# Patient Record
Sex: FEMALE | URBAN - METROPOLITAN AREA
[De-identification: names, ages, dates, MRNs, and addresses within clinical notes are randomized per-mention and may not be internally consistent; named-entity substitution may affect disease eponyms.]

---

## 2021-06-04 ENCOUNTER — IMPORTED ENCOUNTER (OUTPATIENT)
Dept: URBAN - METROPOLITAN AREA CLINIC 1 | Facility: CLINIC | Age: 81
End: 2021-06-04

## 2021-06-04 PROBLEM — H35.3121: Noted: 2021-06-04

## 2021-06-04 PROBLEM — E11.3392: Noted: 2021-06-04

## 2021-06-04 PROBLEM — Z79.4: Noted: 2021-06-04

## 2021-06-04 PROBLEM — E11.3291: Noted: 2021-06-04

## 2021-06-04 PROBLEM — Z96.1: Noted: 2021-06-04

## 2021-06-04 PROBLEM — H25.11: Noted: 2021-06-04

## 2021-06-04 PROBLEM — E11.3293: Noted: 2021-06-04

## 2021-06-04 PROBLEM — H10.45: Noted: 2021-06-04

## 2021-06-04 PROCEDURE — 99204 OFFICE O/P NEW MOD 45 MIN: CPT

## 2021-06-04 PROCEDURE — 92015 DETERMINE REFRACTIVE STATE: CPT

## 2021-06-04 NOTE — PATIENT DISCUSSION
1.  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 Will refer to Retina for further  evaluation/treatment. Discussed the pathophysiology of diabetes and its effect on the eye and risk of blindness. Stressed the importance of strong glucose control. Advised of importance of at least yearly dilated examinations but to contact us immediately for any problems or concerns. 2.  DM Type II (Insulin) with Mild Nonproliferative Diabetic Retinopathy OD No Macular Edema:  Discussed the pathophysiology of diabetes and its effect on the eye and risk of blindness. Stressed the importance of strong glucose control. Advised of importance of at least yearly dilated examinations but to contact us immediately for any problems or concerns. 3. ARMD OS Early/dry/stable. Importance of daily AREDS II study multivitamin and Amsler Grid checks discussed with patient. Patient to follow-up immediately with any new onset of decreased vision and/or metamorphopsia. 4. Cataract OD: Observe for now without intervention. The patient was advised to contact us if any change or worsening of vision5. Pseudophakia OS - (VEC) H/o YAG Cap 6. Allergic Conjunctivitis OU - May use Pataday QD OU for itching. The condition was  discussed with the patient. Avoidance of allergens and cool compresses were recommended. Return for an appointment in 6 month 10/DFE with Dr. Violet Espinosa.

## 2021-12-08 ENCOUNTER — IMPORTED ENCOUNTER (OUTPATIENT)
Dept: URBAN - METROPOLITAN AREA CLINIC 1 | Facility: CLINIC | Age: 81
End: 2021-12-08

## 2021-12-08 PROBLEM — H25.811: Noted: 2021-12-08

## 2021-12-08 PROCEDURE — 92015 DETERMINE REFRACTIVE STATE: CPT

## 2021-12-08 PROCEDURE — 99213 OFFICE O/P EST LOW 20 MIN: CPT

## 2021-12-08 NOTE — PATIENT DISCUSSION
1.  Cataract OD -- Visually Significant secondary to glare discussed the risks benefits alternatives and limitations of cataract surgery. The patient stated a full understanding and a desire to proceed with the procedure. The patient will need to return for preop appointment with cataract measurements and to have any additional questions answered and start pre-operative eye drops as directed. Phaco PCL ODOtherwise follow-up 6 month 10/DFE/Glare2. ARMD OS Early/dry/stable. Importance of daily AREDS II study multivitamin and Amsler Grid checks discussed with patient. Patient to follow-up immediately with any new onset of decreased vision and/or metamorphopsia. 3. 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 Terrie Aguilar. 4. Pseudophakia OS -- (VEC) H/o YAG Cap 5. Allergic Conjunctivitis OU -- May use Pataday QD OU for itching. The condition was  discussed with the patient. Avoidance of allergens and cool compresses were recommended. 6. ERM OS -- ObserveReturn for an appointment for Ascan/H and P. with Dr. Benjamin Gastelum.

## 2021-12-08 NOTE — PATIENT DISCUSSION
1.  Cataract OD: Observe for now without intervention. The patient was advised to contact us if any change or worsening of vision2. H/o ARMD OS Early/dry/stable. Importance of daily AREDS II study multivitamin and Amsler Grid checks discussed with patient. Patient to follow-up immediately with any new onset of decreased vision and/or metamorphopsia. 3. H/o DM Type II (Insulin) with Moderate Nonproliferative Diabetic Retinopathy OS Mild Nonproliferative Diabetic Retinopathy OD No Macular Edema OU -- patient to keep f/u appts with Dr. Joseph Goldman. 4. Pseudophakia OS -- (VEC) H/o YAG Cap 5. Allergic Conjunctivitis OU -- May use Pataday QD OU for itching. The condition was  discussed with the patient. Avoidance of allergens and cool compresses were recommended.

## 2022-01-05 ENCOUNTER — IMPORTED ENCOUNTER (OUTPATIENT)
Dept: URBAN - METROPOLITAN AREA CLINIC 1 | Facility: CLINIC | Age: 82
End: 2022-01-05

## 2022-01-05 PROBLEM — H25.811: Noted: 2022-01-05

## 2022-01-05 PROCEDURE — 92136 OPHTHALMIC BIOMETRY: CPT

## 2022-01-05 NOTE — PATIENT DISCUSSION
1. Cataract OD -- Visually Significant secondary to glare -- discussed the risks benefits alternatives and limitations of cataract surgery. The patient stated a full understanding and a desire to proceed with the procedure. Discussed with patient if PO Gtts are more than $120 for all three combined when filling at their Pharmacy please call our office to request generic substitutions. Pt came to pre-op appointment today with daughter. Lifestyle Questionnaire Completed. Pt understands they will need glasses post-op to achieve their best corrected vision. Phaco PCL ODReturn for an appointment for Return as scheduled with Dr. Edison Avila.

## 2022-01-11 ENCOUNTER — IMPORTED ENCOUNTER (OUTPATIENT)
Dept: URBAN - METROPOLITAN AREA CLINIC 1 | Facility: CLINIC | Age: 82
End: 2022-01-11

## 2022-01-12 ENCOUNTER — IMPORTED ENCOUNTER (OUTPATIENT)
Dept: URBAN - METROPOLITAN AREA CLINIC 1 | Facility: CLINIC | Age: 82
End: 2022-01-12

## 2022-01-12 PROBLEM — Z96.1: Noted: 2022-01-12

## 2022-01-12 PROCEDURE — 99024 POSTOP FOLLOW-UP VISIT: CPT

## 2022-01-12 NOTE — PATIENT DISCUSSION
1. POD#1 CE/IOL OD (Standard) doing well but with significant corneal edema seen today. Use Prednisolone QID OD Ketorolac QID OD Ocuflox QID OD : Use all three gtts through completion of PO gtt chart regimen/ Per our instructions given to patient.   **Start Angela 128 5% gtts QID OD until seen backPost op Warnings Reiterated RTC as scheduled

## 2022-01-19 ENCOUNTER — IMPORTED ENCOUNTER (OUTPATIENT)
Dept: URBAN - METROPOLITAN AREA CLINIC 1 | Facility: CLINIC | Age: 82
End: 2022-01-19

## 2022-01-19 PROBLEM — Z96.1: Noted: 2022-01-19

## 2022-01-19 PROCEDURE — 99024 POSTOP FOLLOW-UP VISIT: CPT

## 2022-01-19 NOTE — PATIENT DISCUSSION
POW#1  CE/IOL OD (Standard w/ Dextenza) doing well. Discontinue Ocuflox and Angela (can use PRN)  Continue Prednisolone TID 1 drop less each week until gone. Continue Prolensa/Ketorolac/Acular QID until gone. Return for an appointment for Return as scheduled with Dr. Roxy Barron.

## 2022-02-02 ENCOUNTER — IMPORTED ENCOUNTER (OUTPATIENT)
Dept: URBAN - METROPOLITAN AREA CLINIC 1 | Facility: CLINIC | Age: 82
End: 2022-02-02

## 2022-02-02 PROBLEM — Z96.1: Noted: 2022-02-02

## 2022-02-02 PROCEDURE — 99024 POSTOP FOLLOW-UP VISIT: CPT

## 2022-02-02 NOTE — PATIENT DISCUSSION
POM#1 CE/IOL OD (Standard w/ Dextenza) doing well. Finish PO meds per PO gtt schedule MRX for glasses givenReturn for an appointment in 1 year 27 with Dr. Arabella De.

## 2022-02-02 NOTE — PATIENT DISCUSSION
POM#1 CE/IOL OD doing well. Continue Lotemax/Durezol/Prednisolone BID until gone. Continue Prolensa/Ilevro/Acular QD until gone.

## 2022-04-02 ASSESSMENT — VISUAL ACUITY
OS_CC: J5
OD_GLARE: 20/80
OD_SC: 20/30-2
OD_CC: 20/60
OD_SC: 20/50
OS_SC: 20/60
OS_CC: 20/60-1
OD_CC: 20/25-1
OS_SC: 20/60
OD_CC: J5
OD_CC: 20/150
OS_CC: 20/70
OD_CC: 20/25-2
OD_PH: SC 20/80

## 2022-04-02 ASSESSMENT — KERATOMETRY
OD_AXISANGLE2_DEGREES: 093
OD_K2POWER_DIOPTERS: 45.50
OD_AXISANGLE_DEGREES: 003
OD_K1POWER_DIOPTERS: 44.75

## 2022-04-02 ASSESSMENT — TONOMETRY
OD_IOP_MMHG: 22
OD_IOP_MMHG: 17
OD_IOP_MMHG: 18
OD_IOP_MMHG: 14
OD_IOP_MMHG: 14
OS_IOP_MMHG: 18
OS_IOP_MMHG: 14
OS_IOP_MMHG: 15
OS_IOP_MMHG: 16

## 2024-04-12 ENCOUNTER — COMPREHENSIVE EXAM (OUTPATIENT)
Dept: URBAN - METROPOLITAN AREA CLINIC 2 | Facility: CLINIC | Age: 84
End: 2024-04-12

## 2024-04-12 DIAGNOSIS — H35.372: ICD-10-CM

## 2024-04-12 DIAGNOSIS — H35.3131: ICD-10-CM

## 2024-04-12 DIAGNOSIS — E11.3393: ICD-10-CM

## 2024-04-12 PROCEDURE — 99214 OFFICE O/P EST MOD 30 MIN: CPT

## 2024-04-12 ASSESSMENT — TONOMETRY
OD_IOP_MMHG: 14
OS_IOP_MMHG: 14

## 2024-04-12 ASSESSMENT — VISUAL ACUITY
OS_SC: 20/40
OD_SC: 20/20-1

## 2025-05-09 ENCOUNTER — COMPREHENSIVE EXAM (OUTPATIENT)
Age: 85
End: 2025-05-09

## 2025-05-09 DIAGNOSIS — E11.3393: ICD-10-CM

## 2025-05-09 DIAGNOSIS — H35.372: ICD-10-CM

## 2025-05-09 DIAGNOSIS — H35.3131: ICD-10-CM

## 2025-05-09 PROCEDURE — 92014 COMPRE OPH EXAM EST PT 1/>: CPT

## 2025-05-09 PROCEDURE — 92134 CPTRZ OPH DX IMG PST SGM RTA: CPT
